# Patient Record
(demographics unavailable — no encounter records)

---

## 2024-11-06 NOTE — ASSESSMENT
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

## 2024-11-06 NOTE — HISTORY OF PRESENT ILLNESS
[FreeTextEntry1] : This 52-year-old right-handed man was seen in neurological consultation today He underwent cardiac catheterization through a right radial artery approach on 2/2/2024.  Says he had no significant coronary artery problems.  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 Denies significant neck pain Has no trouble with his left arm  Medical history significant for hyperlipidemia and hypertension  Social alcohol use, says he never smoked.

## 2024-11-06 NOTE — PHYSICAL EXAM
[FreeTextEntry1] : There is full range of movement of the cervical spine. There is no cervical palpation tenderness. Tinel sign negative at the wrists and elbows. Phalen sign negative at the wrists.  [General Appearance - Alert] : alert [General Appearance - In No Acute Distress] : in no acute distress [General Appearance - Well-Appearing] : healthy appearing [Oriented To Time, Place, And Person] : oriented to person, place, and time [Impaired Insight] : insight and judgment were intact [Affect] : the affect was normal [Memory Recent] : recent memory was not impaired [Person] : oriented to person [Place] : oriented to place [Time] : oriented to time [Concentration Intact] : normal concentrating ability [Visual Intact] : visual attention was ~T not ~L decreased [Fluency] : fluency intact [Comprehension] : comprehension intact [Past History] : adequate knowledge of personal past history [Cranial Nerves Optic (II)] : visual acuity intact bilaterally,  visual fields full to confrontation, pupils equal round and reactive to light [Cranial Nerves Oculomotor (III)] : extraocular motion intact [Cranial Nerves Trigeminal (V)] : facial sensation intact symmetrically [Cranial Nerves Facial (VII)] : face symmetrical [Cranial Nerves Vestibulocochlear (VIII)] : hearing was intact bilaterally [Cranial Nerves Glossopharyngeal (IX)] : tongue and palate midline [Cranial Nerves Accessory (XI - Cranial And Spinal)] : head turning and shoulder shrug symmetric [Cranial Nerves Hypoglossal (XII)] : there was no tongue deviation with protrusion [Motor Tone] : muscle tone was normal in all four extremities [Motor Strength] : muscle strength was normal in all four extremities [No Muscle Atrophy] : normal bulk in all four extremities [Paresis Pronator Drift Right-Sided] : no pronator drift on the right [Paresis Pronator Drift Left-Sided] : no pronator drift on the left [Sensation Tactile Decrease] : light touch was intact [Sensation Pain / Temperature Decrease] : pain and temperature was intact [Abnormal Walk] : normal gait [Balance] : balance was intact [Past-pointing] : there was no past-pointing [Tremor] : no tremor present [Coordination - Dysmetria Impaired Finger-to-Nose Bilateral] : not present [2+] : Patella left 2+ [PERRL With Normal Accommodation] : pupils were equal in size, round, reactive to light, with normal accommodation [Extraocular Movements] : extraocular movements were intact [Full Visual Field] : full visual field

## 2025-02-19 NOTE — ASSESSMENT
[FreeTextEntry1] : This is a case of a 52 yr right handed male with PMH of peripheral neuritis, seizures, pre dm? CAD, HLD presents today with headaches. Refused Medrol dose pack and medication at this time   Plan:     {      }  Labs: CBC wit diff, ESR, CRP    {      } temporal US    {      }  sleep study for CARLOS    {      } F/u with Ophthalmology-  Headache education provided: [] Stay well hydrated [] Limit excessive caffeine and alcohol intake [] Maintain good sleep hygiene [] Try to avoid triggers [] Practice good eating habits [] Avoid excessive use of over the counter pain medications, as they can cause medication overuse headaches  [] Keep a headache diary [] Relaxation techniques, biofeedback, massage therapy, acupunctures, and heating pads may be effective  The above plan was discussed with YEIMI TEMPLE in great detail. YEIMI TEMPLE verbalized understanding and agrees with plan as detailed above. Patient was provided education and counselling on current diagnosis/symptoms. She was advised to call our clinic at [phone] for any new or worsening symptoms, or with any questions or concerns. In case of acute onset of neurological symptoms or worsening presentation, patient was advised to present to nearest emergency room for further evaluation. YEIMI TEMPLE expressed understanding and all her questions/concerns were addressed.

## 2025-02-19 NOTE — HISTORY OF PRESENT ILLNESS
[FreeTextEntry1] : This is a case of a 52 yr right handed male with PMH of peripheral neuritis, seizures, pre dm? CAD, HLD presents today with headaches.  Pt has had a history of headaches r/t poor sleep habits.  Pt works as a  in [place].  Hx of possible seizure in 2020? was seen in [facility].  CT showed chronic lacunar infarcts.  Recently in the past 2 and half months ago he noticed constant headaches.   Pt thought it was an ear infection and saw ENT (Dr. Willow AL)  who told him it was a normal exam.    Headaches are daily.  Pt states they are all day.  Located right side/temple and right ear.   Described as throbbing and pressure/stabbing.  Denies shooting pain.  Denies n/v.  Denies photophobia or phonophobia.  Denies visual loss, doubled, blurred. Denies sens to touch.  Denies extremity weakness or slurred speech.  Denies dizziness.  Denies tinnitus. Does have neck pain.  Denies fevers.  Denies positional worsening with headaches.   Treating with 800-1000 mg x 2 times a day.  Pt also reports excessive snoring and does report some early morning HA as well.  Has not been evaluated for CARLOS.   MRI w/wo brain-  Feb 14, 2025 impression: unremarkable contrast enhanced MRI of the trigeminal nerves.   Similar nonspecific foci of T2 signal abnormality within the periventricular and subcortical white matter which are setting migraine headaches.   saw olpt 5 months ago normal exam   Occupation: [employer]  sleep: very poor - couple hrs  Allergies: NKA

## 2025-02-19 NOTE — PHYSICAL EXAM
[General Appearance - Alert] : alert [General Appearance - Well Nourished] : well nourished [Oriented To Time, Place, And Person] : oriented to person, place, and time [Affect] : the affect was normal [Person] : oriented to person [Place] : oriented to place [Skin Color & Pigmentation] : normal skin color and pigmentation [Cranial Nerves Oculomotor (III)] : extraocular motion intact [Motor Tone] : muscle tone was normal in all four extremities [Motor Strength] : muscle strength was normal in all four extremities [Abnormal Walk] : normal gait [Balance] : balance was intact [2+] : Biceps left 2+ [] : no respiratory distress [FreeTextEntry1] : tension right

## 2025-02-19 NOTE — REVIEW OF SYSTEMS
[Negative] : Psychiatric [Earache] : earache [Neck Pain] : neck pain [As Noted in HPI] : as noted in HPI